# Patient Record
Sex: FEMALE | Race: WHITE | NOT HISPANIC OR LATINO | Employment: FULL TIME | URBAN - METROPOLITAN AREA
[De-identification: names, ages, dates, MRNs, and addresses within clinical notes are randomized per-mention and may not be internally consistent; named-entity substitution may affect disease eponyms.]

---

## 2017-01-09 ENCOUNTER — HOSPITAL ENCOUNTER (OUTPATIENT)
Dept: GENERAL RADIOLOGY | Facility: HOSPITAL | Age: 47
Discharge: HOME | End: 2017-01-09

## 2017-01-09 ENCOUNTER — ANESTHESIA (OUTPATIENT)
Dept: PAIN MEDICINE | Facility: HOSPITAL | Age: 47
End: 2017-01-09

## 2017-01-09 ENCOUNTER — ANESTHESIA EVENT (OUTPATIENT)
Dept: PAIN MEDICINE | Facility: HOSPITAL | Age: 47
End: 2017-01-09

## 2017-01-09 ENCOUNTER — HOSPITAL ENCOUNTER (OUTPATIENT)
Dept: PAIN MEDICINE | Facility: HOSPITAL | Age: 47
Discharge: HOME | End: 2017-01-09
Admitting: ORTHOPAEDIC SURGERY

## 2017-01-09 VITALS
DIASTOLIC BLOOD PRESSURE: 96 MMHG | HEART RATE: 96 BPM | OXYGEN SATURATION: 96 % | SYSTOLIC BLOOD PRESSURE: 131 MMHG | TEMPERATURE: 98.5 F | RESPIRATION RATE: 16 BRPM

## 2017-01-09 DIAGNOSIS — R52 PAIN: ICD-10-CM

## 2017-01-09 PROCEDURE — C1755 CATHETER, INTRASPINAL: HCPCS

## 2017-01-09 PROCEDURE — 25010000002 METHYLPREDNISOLONE PER 80 MG: Performed by: ANESTHESIOLOGY

## 2017-01-09 PROCEDURE — 77003 FLUOROGUIDE FOR SPINE INJECT: CPT

## 2017-01-09 RX ORDER — METHYLPREDNISOLONE ACETATE 80 MG/ML
80 INJECTION, SUSPENSION INTRA-ARTICULAR; INTRALESIONAL; INTRAMUSCULAR; SOFT TISSUE ONCE
Status: COMPLETED | OUTPATIENT
Start: 2017-01-09 | End: 2017-01-09

## 2017-01-09 RX ADMIN — METHYLPREDNISOLONE ACETATE 80 MG: 80 INJECTION, SUSPENSION INTRA-ARTICULAR; INTRALESIONAL; INTRAMUSCULAR; SOFT TISSUE at 14:06

## 2017-01-09 NOTE — IP AVS SNAPSHOT
AFTER VISIT FREIDA Arizmendi           About your hospitalization     You last received care in the:  [facility]    Unit phone number:  [phone]       Medications    If you or your caregiver advised us that you are currently taking a medication and that medication is marked below as “Resume”, this simply indicates that we have reviewed those medications to make sure our new therapy recommendations do not interfere.  If you have concerns about medications other than those new ones which we are prescribing today, please consult the physician who prescribed them (or your primary physician).  Our review of your home medications is not meant to indicate that we are directing their use.             Your Medications      CONTINUE taking these medications     acetaminophen 650 MG 8 hr tablet   Take 650 mg by mouth Every 8 (Eight) Hours As Needed for mild pain (1-3).   Commonly known as:  TYLENOL           AMBIEN 10 MG tablet   Take 10 mg by mouth At Night As Needed for sleep.   Generic drug:  zolpidem           amphetamine-dextroamphetamine XR 10 MG 24 hr capsule   Take 10 mg by mouth Every Morning. Pt. Takes 30 mg bid.   Commonly known as:  ADDERALL XR           aspirin 81 MG EC tablet   Take 81 mg by mouth Daily.           diclofenac 75 MG EC tablet   Take 75 mg by mouth 2 (Two) Times a Day.   Commonly known as:  VOLTAREN           DULoxetine 60 MG capsule   Take 60 mg by mouth Daily.   Commonly known as:  CYMBALTA           fluticasone 50 MCG/ACT nasal spray   2 sprays into each nostril Daily.   Commonly known as:  FLONASE           orphenadrine 100 MG 12 hr tablet   Take 100 mg by mouth 2 (Two) Times a Day.   Commonly known as:  NORFLEX           oxyCODONE-acetaminophen  MG per tablet   Take 1 tablet by mouth Every 6 (Six) Hours As Needed for moderate pain (4-6).   Commonly known as:  PERCOCET                    Instructions for After Discharge          Discharge  Instructions       Lumbar Epidural Steroid Injection Instructions  Plan includes:  1.  Lumbar epidural steroid injections, up to 3, spaced 1-2 weeks apart.  If pain control is acceptable after 1 or 2 injections, it was discussed with the patient that they may return for the subsequent injections if and when their pain returns.  The risks were discussed with the patient including failure of relief, worsening pain, Headache (post dural puncture headache), bleeding (epidural hematoma) and infection (epidural abscess or skin infection).  2.  Physical therapy exercises at home as prescribed by physical therapy or from the pain clinic handout (given to the patient).  Continuation of these exercises every day, or multiple times per week, even when the patient has good pain relief, was stressed to the patient as a preventative measure to decrease the frequency and severity of future pain episodes.  3.  Continue pain medicines as already prescribed.  If patient not currently taking any, it is recommended to begin Acetaminophen 1000 mg po q 8 hours.  If other medicines containing Acetaminophen are currently prescribed, maintain daily dose at 3000 mg.    4.  If they can tolerate NSAIDS, it is recommended to take Ibuprofen 600 mg po q 6 hours for 7 days during pain exacerbations.  Alternatively, they may substitute an NSAID of their choice (e.g. Aleve).  This may be taken at the same time as Acetaminophen.  5.  Heat and ice to the affected area as tolerated for pain control.  It was discussed that heating pads can cause burns.  6.  Daily low impact exercise such as walking or water exercise was recommended to maintain overall health and aid in weight control.   7.  Follow up as needed for subsequent injections.  8.  Patient was counseled to abstain from tobacco products.      Discharge References/Attachments     EPIDURAL STEROID INJECTION, CARE AFTER (ENGLISH)    BACK EXERCISES, EASY-TO-READ (ENGLISH)       Follow-ups for After  Discharge        [employer] Signup     [facility] [employer] allows you to send messages to your doctor, view your test results, renew your prescriptions, schedule appointments, and more. To sign up, go to [employer] and click on the Sign Up Now link in the New User? box. Enter your [employer] Activation Code exactly as it appears below along with the last four digits of your Social Security Number and your Date of Birth () to complete the sign-up process. If you do not sign up before the expiration date, you must request a new code.    [employer] Activation Code: [email or web address]  Expires: 2017  5:36 AM    If you have questions, you can email [email or web address] or call [phone] to talk to our [employer] staff. Remember, [employer] is NOT to be used for urgent needs. For medical emergencies, dial 911.           Summary of Your Hospitalization        Reason for Hospitalization     Your primary diagnosis was:  Not on File      Care Providers     Not on file      Your Allergies  Date Reviewed: 2017    Allergen Reactions    Vicodin (Hydrocodone-Acetaminophen) Itching      Patient Belongings Returned     Document Return of Belongings Flowsheet     Were the patient bedside belongings sent home?   N/A   Belongings Retrieved from Security & Sent Home   N/A    Belongings Sent to Safe   None   Medications Retrieved from Pharmacy & Sent Home   --              More Information      Epidural Steroid Injection, Care After  Refer to this sheet in the next few weeks. These instructions provide you with information on caring for yourself after your procedure. Your health care provider may also give you more specific instructions. Your treatment has been planned according to current medical practices, but problems sometimes occur. Call your health care provider if you have any problems or questions after your procedure.  WHAT TO EXPECT AFTER THE PROCEDURE  After your procedure, it is typical to have  minimal discomfort at the injection site.  HOME CARE INSTRUCTIONS   · Avoid the use of heat on the injection site for 1 day.  · Do not take a tub bath or soak in water the day of the procedure.  · Remove the bandage on the day after the procedure.  · Resume your normal activities the day after the procedure.  · Apply ice to reduce the soreness around the injection site:    Put ice in a plastic bag.    Place a towel between your skin and the bag.    Leave the ice on for 20 minutes, 2-3 times a day.  · Follow up with your health care provider 7-10 days after the procedure.  SEEK MEDICAL CARE IF:   · You have a fever.  · You continue to have pain and soreness around the injection site, even after taking medicines.  · You have significant nausea or vomiting.  · You have a severe headache.  SEEK IMMEDIATE MEDICAL CARE IF:   · You have severe pain at the injection site, which is not relieved by medicines.  · You have a severe headache, stiff neck, or sensitivity to light.  · You have any new numbness or weakness in your legs or arms.  · You lose control over your bladder or bowel movements.  · You have difficulty breathing.     This information is not intended to replace advice given to you by your health care provider. Make sure you discuss any questions you have with your health care provider.     Document Released: 2012 Document Revised: 2016 Document Reviewed: 2014  [employer] Interactive Patient Education ©[employer]          Back Exercises  If you have pain in your back, do these exercises 2-3 times each day or as told by your doctor. When the pain goes away, do the exercises once each day, but repeat the steps more times for each exercise (do more repetitions). If you do not have pain in your back, do these exercises once each day or as told by your doctor.  EXERCISES  Single Knee to Chest  Do these steps 3-5 times in a row for each le. Lie on your back on a firm bed or the floor with  your legs stretched out.  2. Bring one knee to your chest.  3. Hold your knee to your chest by grabbing your knee or thigh.  4. Pull on your knee until you feel a gentle stretch in your lower back.  5. Keep doing the stretch for 10-30 seconds.  6. Slowly let go of your leg and straighten it.  Pelvic Tilt  Do these steps 5-10 times in a row:  1. Lie on your back on a firm bed or the floor with your legs stretched out.  2. Bend your knees so they point up to the ceiling. Your feet should be flat on the floor.  3. Tighten your lower belly (abdomen) muscles to press your lower back against the floor. This will make your tailbone point up to the ceiling instead of pointing down to your feet or the floor.  4. Stay in this position for 5-10 seconds while you gently tighten your muscles and breathe evenly.  Cat-Cow  Do these steps until your lower back bends more easily:  1. Get on your hands and knees on a firm surface. Keep your hands under your shoulders, and keep your knees under your hips. You may put padding under your knees.  2. Let your head hang down, and make your tailbone point down to the floor so your lower back is round like the back of a cat.  3. Stay in this position for 5 seconds.  4. Slowly lift your head and make your tailbone point up to the ceiling so your back hangs low (sags) like the back of a cow.  5. Stay in this position for 5 seconds.  Press-Ups  Do these steps 5-10 times in a row:  1. Lie on your belly (face-down) on the floor.  2. Place your hands near your head, about shoulder-width apart.  3. While you keep your back relaxed and keep your hips on the floor, slowly straighten your arms to raise the top half of your body and lift your shoulders. Do not use your back muscles. To make yourself more comfortable, you may change where you place your hands.  4. Stay in this position for 5 seconds.  5. Slowly return to lying flat on the floor.  Bridges  Do these steps 10 times in a row:  1. Lie on  your back on a firm surface.  2. Bend your knees so they point up to the ceiling. Your feet should be flat on the floor.  3. Tighten your butt muscles and lift your butt off of the floor until your waist is almost as high as your knees. If you do not feel the muscles working in your butt and the back of your thighs, slide your feet 1-2 inches farther away from your butt.  4. Stay in this position for 3-5 seconds.  5. Slowly lower your butt to the floor, and let your butt muscles relax.  If this exercise is too easy, try doing it with your arms crossed over your chest.  Belly Crunches  Do these steps 5-10 times in a row:  1. Lie on your back on a firm bed or the floor with your legs stretched out.  2. Bend your knees so they point up to the ceiling. Your feet should be flat on the floor.  3. Cross your arms over your chest.  4. Tip your chin a little bit toward your chest but do not bend your neck.  5. Tighten your belly muscles and slowly raise your chest just enough to lift your shoulder blades a tiny bit off of the floor.  6. Slowly lower your chest and your head to the floor.  Back Lifts  Do these steps 5-10 times in a row:  1. Lie on your belly (face-down) with your arms at your sides, and rest your forehead on the floor.  2. Tighten the muscles in your legs and your butt.  3. Slowly lift your chest off of the floor while you keep your hips on the floor. Keep the back of your head in line with the curve in your back. Look at the floor while you do this.  4. Stay in this position for 3-5 seconds.  5. Slowly lower your chest and your face to the floor.  GET HELP IF:  · Your back pain gets a lot worse when you do an exercise.  · Your back pain does not lessen 2 hours after you exercise.  If you have any of these problems, stop doing the exercises. Do not do them again unless your doctor says it is okay.  GET HELP RIGHT AWAY IF:  · You have sudden, very bad back pain. If this happens, stop doing the exercises. Do  not do them again unless your doctor says it is okay.     This information is not intended to replace advice given to you by your health care provider. Make sure you discuss any questions you have with your health care provider.     Document Released: 01/20/2012 Document Revised: 09/07/2016 Document Reviewed: 02/11/2016  [employer] Interactive Patient Education ©2016 [employer].            SYMPTOMS OF A STROKE    Call 911 or have someone take you to the Emergency Department if you have any of the following:    · Sudden numbness or weakness of your face, arm or leg especially on one side of the body  · Sudden confusion, diffiiculty speaking or trouble understanding   · Changes in your vision or loss of sight in one eye  · Sudden severe headache with no known cause  · sudden dizziness, trouble walking, loss of balance or coordination    It is important to seek emergency care right away if you have further stroke symptoms. If you get emergency help quickly, the powerful clot-dissolving medicines can reduce the disabilities caused by a stroke.     For more information:    American Stroke Association  3-126-8-STROKE  www.strokeassociation.org           IF YOU SMOKE OR USE TOBACCO PLEASE READ THE FOLLOWING:    Why is smoking bad for me?  Smoking increases the risk of heart disease, lung disease, vascular disease, stroke, and cancer.     If you smoke, STOP!    If you would like more information on quitting smoking, please visit the [employer] website: [email or web address]   This link will provide additional resources including the QUIT line and the Beat the Pack support groups.     For more information:    American Cancer Society  ([phone] 602-5868    American Heart Association  1-389.725.3842               YOU ARE THE MOST IMPORTANT FACTOR IN YOUR RECOVERY.     Follow all instructions carefully.     I have reviewed my discharge instructions with my nurse, including the  following information, if applicable:     Information about my illness and diagnosis   Follow up appointments (including lab draws)   Wound Care   Equipment Needs   Medications (new and continuing) along with side effects   Preventative information such as vaccines and smoking cessations   Diet   Pain   I know when to contact my Doctor's office or seek emergency care      I want my nurse to describe the side effects of my medications: YES NO   If the answer is no, I understand the side effects of my medications: YES NO   My nurse described the side effects of my medications in a way that I could understand: YES NO   I have taken my personal belongings and my own medications with me at discharge: YES NO            I have received this information and my questions have been answered. I have discussed any concerns I see with this plan with the nurse or physician. I understand these instructions.    Signature of Patient or Responsible Person: _____________________________________    Date: _________________  Time: __________________    Signature of Healthcare Provider: _______________________________________  Date: _________________  Time: __________________

## 2017-01-09 NOTE — H&P
[facility]    History and Physical    Patient Name: Kamille Arizmendi  :  1970  MRN:  [id number]  Date of Admission: 2017    Subjective     Patient is a 46 y.o. female presents with chief complaint of acute low back, hips: right, buttock, knee: right and ankle: right pain.  Onset of symptoms was gradual starting several months ago.  Symptoms are associated/aggravated by nothing in particular. Symptoms improve with injection  She's had several months of radicular symptoms on the left side they were down below the knee one point time, now majority of her symptoms are posterior.  She describes pain in her hip and buttock and down the posterior aspect of her leg, very few symptoms down below the knee at this point in time.  She also has minimal back pain as well.  She's had 2 previous epidural steroid injections for which she reports probably 70% relief of her pain.    She has an MRI which shows an L5-S1 paracentral disc bulge in L4 5 and L3 4 broad-based disc bulge.    The following portions of the patients history were reviewed and updated as appropriate: current medications, allergies, past medical history, past surgical history, past family history, past social history and problem list                Objective     Past Medical History:   Past Medical History   Diagnosis Date   • ADHD (attention deficit hyperactivity disorder)    • Anxiety    • Arthritis    • Hypertension    • Low back pain    • Personal history of kidney stones      Past Surgical History:   Past Surgical History   Procedure Laterality Date   • Cholecystectomy     • Hysterectomy     • Lasik     • Ovarian cyst removal       Family History: History reviewed. No pertinent family history.  Social History:   Social History   Substance Use Topics   • Smoking status: Never Smoker   • Smokeless tobacco: Never Used   • Alcohol use No       Vital Signs Range for the last 24 hours  Temperature: Temp:  [36.9 °C (98.5 °F)] 36.9 °C (98.5 °F)   Temp  Source: Temp src: Oral   BP: BP: (156-180)/(106-108) 156/106   Pulse: Heart Rate:  [93] 93   Respirations: Resp:  [16] 16   SPO2: SpO2:  [99 %] 99 %   O2 Amount (l/min):     O2 Devices O2 Device: room air   Weight:           --------------------------------------------------------------------------------    Current Outpatient Prescriptions   Medication Sig Dispense Refill   • acetaminophen (TYLENOL) 650 MG 8 hr tablet Take 650 mg by mouth Every 8 (Eight) Hours As Needed for mild pain (1-3).     • amphetamine-dextroamphetamine XR (ADDERALL XR) 10 MG 24 hr capsule Take 10 mg by mouth Every Morning. Pt. Takes 30 mg bid.     • diclofenac (VOLTAREN) 75 MG EC tablet Take 75 mg by mouth 2 (Two) Times a Day.     • DULoxetine (CYMBALTA) 60 MG capsule Take 60 mg by mouth Daily.     • fluticasone (FLONASE) 50 MCG/ACT nasal spray 2 sprays into each nostril Daily.     • orphenadrine (NORFLEX) 100 MG 12 hr tablet Take 100 mg by mouth 2 (Two) Times a Day.     • oxyCODONE-acetaminophen (PERCOCET)  MG per tablet Take 1 tablet by mouth Every 6 (Six) Hours As Needed for moderate pain (4-6).     • zolpidem (AMBIEN) 10 MG tablet Take 10 mg by mouth At Night As Needed for sleep.     • aspirin 81 MG EC tablet Take 81 mg by mouth Daily.       No current facility-administered medications for this encounter.        --------------------------------------------------------------------------------  Assessment/Plan      Anesthesia Evaluation      Airway   Mallampati: II  Dental - normal exam     Pulmonary - normal exam   Cardiovascular - normal exam  (+) hypertension,     Neuro/Psych- negative ROS  GI/Hepatic/Renal/Endo - negative ROS     Musculoskeletal (-) normal exam    Abdominal    Substance History      OB/GYN          Other                        Diagnosis and Plan    Treatment Plan  ASA 2        Lumbar Epidural Steroid Injection(LESI), With fluoroscopy,           Diagnosis     * Lumbar neuritis [M54.16]     * Lumbar disc disorder  [M51.9]

## 2017-01-09 NOTE — DISCHARGE INSTRUCTIONS
Lumbar Epidural Steroid Injection Instructions  Plan includes:  1.  Lumbar epidural steroid injections, up to 3, spaced 1-2 weeks apart.  If pain control is acceptable after 1 or 2 injections, it was discussed with the patient that they may return for the subsequent injections if and when their pain returns.  The risks were discussed with the patient including failure of relief, worsening pain, Headache (post dural puncture headache), bleeding (epidural hematoma) and infection (epidural abscess or skin infection).  2.  Physical therapy exercises at home as prescribed by physical therapy or from the pain clinic handout (given to the patient).  Continuation of these exercises every day, or multiple times per week, even when the patient has good pain relief, was stressed to the patient as a preventative measure to decrease the frequency and severity of future pain episodes.  3.  Continue pain medicines as already prescribed.  If patient not currently taking any, it is recommended to begin Acetaminophen 1000 mg po q 8 hours.  If other medicines containing Acetaminophen are currently prescribed, maintain daily dose at 3000 mg.    4.  If they can tolerate NSAIDS, it is recommended to take Ibuprofen 600 mg po q 6 hours for 7 days during pain exacerbations.  Alternatively, they may substitute an NSAID of their choice (e.g. Aleve).  This may be taken at the same time as Acetaminophen.  5.  Heat and ice to the affected area as tolerated for pain control.  It was discussed that heating pads can cause burns.  6.  Daily low impact exercise such as walking or water exercise was recommended to maintain overall health and aid in weight control.   7.  Follow up as needed for subsequent injections.  8.  Patient was counseled to abstain from tobacco products.

## 2017-06-23 RX ORDER — DEXTROAMPHETAMINE SACCHARATE, AMPHETAMINE ASPARTATE, DEXTROAMPHETAMINE SULFATE AND AMPHETAMINE SULFATE 7.5; 7.5; 7.5; 7.5 MG/1; MG/1; MG/1; MG/1
30 TABLET ORAL DAILY
COMMUNITY

## 2017-06-23 RX ORDER — CETIRIZINE HYDROCHLORIDE 10 MG/1
10 TABLET ORAL DAILY
COMMUNITY

## 2017-07-06 ENCOUNTER — OFFICE VISIT (OUTPATIENT)
Dept: NEUROSURGERY | Facility: CLINIC | Age: 47
End: 2017-07-06

## 2017-07-06 VITALS
SYSTOLIC BLOOD PRESSURE: 150 MMHG | BODY MASS INDEX: 32.49 KG/M2 | DIASTOLIC BLOOD PRESSURE: 88 MMHG | WEIGHT: 207 LBS | HEIGHT: 67 IN | HEART RATE: 110 BPM

## 2017-07-06 DIAGNOSIS — M51.16 NEURITIS OR RADICULITIS DUE TO RUPTURE OF LUMBAR INTERVERTEBRAL DISC: Primary | ICD-10-CM

## 2017-07-06 PROCEDURE — 99204 OFFICE O/P NEW MOD 45 MIN: CPT | Performed by: NEUROLOGICAL SURGERY

## 2017-07-06 RX ORDER — DULOXETIN HYDROCHLORIDE 30 MG/1
CAPSULE, DELAYED RELEASE ORAL
Refills: 4 | COMMUNITY
Start: 2017-06-28 | End: 2017-08-24

## 2017-07-06 RX ORDER — LISDEXAMFETAMINE DIMESYLATE 50 MG
CAPSULE ORAL
Refills: 0 | COMMUNITY
Start: 2017-06-05 | End: 2017-08-24

## 2017-07-06 RX ORDER — BUSPIRONE HYDROCHLORIDE 15 MG/1
TABLET ORAL
Refills: 1 | COMMUNITY
Start: 2017-06-28 | End: 2017-08-24

## 2017-07-13 ENCOUNTER — APPOINTMENT (OUTPATIENT)
Dept: MRI IMAGING | Facility: HOSPITAL | Age: 47
End: 2017-07-13
Attending: NEUROLOGICAL SURGERY

## 2017-07-25 ENCOUNTER — HOSPITAL ENCOUNTER (OUTPATIENT)
Dept: GENERAL RADIOLOGY | Facility: HOSPITAL | Age: 47
Discharge: HOME | End: 2017-07-25
Attending: NEUROLOGICAL SURGERY | Admitting: NEUROLOGICAL SURGERY

## 2017-07-25 DIAGNOSIS — M51.16 NEURITIS OR RADICULITIS DUE TO RUPTURE OF LUMBAR INTERVERTEBRAL DISC: ICD-10-CM

## 2017-07-25 PROCEDURE — 72114 X-RAY EXAM L-S SPINE BENDING: CPT

## 2017-07-27 ENCOUNTER — OFFICE VISIT (OUTPATIENT)
Dept: NEUROSURGERY | Facility: CLINIC | Age: 47
End: 2017-07-27

## 2017-07-27 VITALS
SYSTOLIC BLOOD PRESSURE: 156 MMHG | BODY MASS INDEX: 32.49 KG/M2 | WEIGHT: 207 LBS | DIASTOLIC BLOOD PRESSURE: 91 MMHG | HEART RATE: 92 BPM | HEIGHT: 67 IN

## 2017-07-27 DIAGNOSIS — M51.16 NEURITIS OR RADICULITIS DUE TO RUPTURE OF LUMBAR INTERVERTEBRAL DISC: Primary | ICD-10-CM

## 2017-07-27 PROCEDURE — 99213 OFFICE O/P EST LOW 20 MIN: CPT | Performed by: NEUROLOGICAL SURGERY

## 2017-07-27 RX ORDER — CEFAZOLIN SODIUM 2 G/100ML
2 INJECTION, SOLUTION INTRAVENOUS ONCE
Status: CANCELLED | OUTPATIENT
Start: 2017-09-06 | End: 2017-07-27

## 2017-07-27 NOTE — PATIENT INSTRUCTIONS

## 2017-07-27 NOTE — PROGRESS NOTES
Subjective   Patient ID: Kamille Arizmendi is a 46 y.o. female is here today for follow-up with new MRI and XR ordered for back pain that radiates into left leg with numbness and tingling.    Neurologic Problem   The patient's pertinent negatives include no altered mental status, clumsiness, focal sensory loss, focal weakness, loss of balance, memory loss, near-syncope, slurred speech, syncope, visual change or weakness. This is a chronic problem. The current episode started more than 1 year ago. The neurological problem developed gradually. The problem has been gradually worsening since onset. There was left-sided and lower extremity focality noted. Associated symptoms include back pain. Pertinent negatives include no abdominal pain, auditory change, aura, bladder incontinence, bowel incontinence, chest pain, confusion, diaphoresis, dizziness, fatigue, fever, headaches, light-headedness, nausea, neck pain, palpitations, shortness of breath, vertigo or vomiting. Past treatments include acetaminophen, medication, position change, sleep and walking. The treatment provided mild relief.     This patient continues with severe pain in her back and into her left leg.  Her right leg is okay.    The following portions of the patient's history were reviewed and updated as appropriate: allergies, current medications, past family history, past medical history, past social history, past surgical history and problem list.    Review of Systems   Constitutional: Negative for diaphoresis, fatigue and fever.   Respiratory: Negative for shortness of breath.    Cardiovascular: Negative for chest pain, palpitations and near-syncope.   Gastrointestinal: Negative for abdominal pain, bowel incontinence, nausea and vomiting.   Genitourinary: Negative for bladder incontinence.   Musculoskeletal: Positive for back pain. Negative for neck pain.   Neurological: Positive for numbness (Left leg). Negative for dizziness, vertigo, focal weakness,  syncope, weakness, light-headedness, headaches and loss of balance.        Tingling in left leg   Psychiatric/Behavioral: Negative for confusion and memory loss.   All other systems reviewed and are negative.      Objective   Physical Exam   Constitutional: She is oriented to person, place, and time. She appears well-developed and well-nourished.   Neurological: She is oriented to person, place, and time.     Neurologic Exam     Mental Status   Oriented to person, place, and time.       Assessment/Plan   Independent Review of Radiographic Studies:      I reviewed plain films of her lumbar spine done on 25 July.  This does show her hip implant on the left side.  There is degenerative change particularly at L5-S1 and some minimal lumbar scoliosis and no evidence of abnormal movement on flexion and extension.  I also reviewed an MRI of her lumbar spine done on July 13 myself.  This looks okay at L1 2.  There is a little disc bulging at L2-3 but not severe.  L3 4 shows a little central stenosis and some disc bulging but no significant pressure on the nerves.  L4 5 shows a more significant central stenosis and some disc bulging into the neural foramen on the left side causing pressure almost certainly on the left L4 nerve root in the foramen.  There is also a central and left-sided disc herniation at L5-S1 that is causing pressure on the S1 and probably a little pressure on the L5 nerve root as well.  The disc at L4 5 appears to be a little larger than it was last year in the disc at L5-S1 is about the same size.    Medical Decision Making:      I told the patient and her wife about the findings on the MRI.  I told her that there is nothing here so dangerous that we absolutely have to do surgery but it is certainly an option if she wishes to proceed.  I told the patient about the risks, complications and expected outcome of the lumbar surgery.  I explained that there was an 80% chance of getting rid of the pain in the  leg.  I explained that there would still be back pain after the surgery.  Initially this will be quite severe but will improve over time.  There is a 2 or 3% chance of infection, bleeding, CSF leak, damage to the nerve as a result of surgery, paralysis, as well as anesthetic risk.  There is a 5% chance of late instability which could require a fusion later on and a 10% chance of recurrent disc herniation.  We discussed the postoperative hospital and home course.  I also discussed my policies on pain medications with her.  She will think about it and let us know if she wishes to proceed.    If she calls she will need to be scheduled for a: Left L4 to S1 laminectomy and discectomy with Metrix    Kamille was seen today for back pain.    Diagnoses and all orders for this visit:    Neuritis or radiculitis due to rupture of lumbar intervertebral disc  -     Case Request; Standing  -     ceFAZolin (ANCEF) 2 g in sodium chloride 0.9 % 100 mL IVPB; Infuse 2 g into a venous catheter 1 (One) Time.    Other orders  -     Follow anesthesia standing orders.  -     Obtain informed consent  -     Follow anesthesia standing orders.; Standing  -     ASUNCION hose- To be placed on patient in pre-op; Standing  -     SCD (sequential compression device)- to be placed on patient in Pre-op; Standing      Return for 2-3 week post op.

## 2017-08-24 ENCOUNTER — OFFICE VISIT (OUTPATIENT)
Dept: NEUROSURGERY | Facility: CLINIC | Age: 47
End: 2017-08-24

## 2017-08-24 ENCOUNTER — APPOINTMENT (OUTPATIENT)
Dept: PREADMISSION TESTING | Facility: HOSPITAL | Age: 47
End: 2017-08-24

## 2017-08-24 VITALS
SYSTOLIC BLOOD PRESSURE: 143 MMHG | BODY MASS INDEX: 32.02 KG/M2 | OXYGEN SATURATION: 100 % | DIASTOLIC BLOOD PRESSURE: 89 MMHG | HEIGHT: 67 IN | RESPIRATION RATE: 16 BRPM | HEART RATE: 93 BPM | WEIGHT: 204 LBS | TEMPERATURE: 97.5 F

## 2017-08-24 VITALS
BODY MASS INDEX: 32.02 KG/M2 | SYSTOLIC BLOOD PRESSURE: 148 MMHG | DIASTOLIC BLOOD PRESSURE: 93 MMHG | HEART RATE: 104 BPM | WEIGHT: 204 LBS | HEIGHT: 67 IN

## 2017-08-24 DIAGNOSIS — M51.16 NEURITIS OR RADICULITIS DUE TO RUPTURE OF LUMBAR INTERVERTEBRAL DISC: Primary | ICD-10-CM

## 2017-08-24 LAB
ANION GAP SERPL CALCULATED.3IONS-SCNC: 13.3 MMOL/L
BUN BLD-MCNC: 13 MG/DL (ref 6–20)
BUN/CREAT SERPL: 17.6 (ref 7–25)
CALCIUM SPEC-SCNC: 9.3 MG/DL (ref 8.6–10.5)
CHLORIDE SERPL-SCNC: 102 MMOL/L (ref 98–107)
CO2 SERPL-SCNC: 26.7 MMOL/L (ref 22–29)
CREAT BLD-MCNC: 0.74 MG/DL (ref 0.57–1)
DEPRECATED RDW RBC AUTO: 47.5 FL (ref 37–54)
ERYTHROCYTE [DISTWIDTH] IN BLOOD BY AUTOMATED COUNT: 13.2 % (ref 11.7–13)
GFR SERPL CREATININE-BSD FRML MDRD: 84 ML/MIN/1.73
GLUCOSE BLD-MCNC: 105 MG/DL (ref 65–99)
HCT VFR BLD AUTO: 44.6 % (ref 35.6–45.5)
HGB BLD-MCNC: 14 G/DL (ref 11.9–15.5)
MCH RBC QN AUTO: 30.7 PG (ref 26.9–32)
MCHC RBC AUTO-ENTMCNC: 31.4 G/DL (ref 32.4–36.3)
MCV RBC AUTO: 97.8 FL (ref 80.5–98.2)
PLATELET # BLD AUTO: 271 10*3/MM3 (ref 140–500)
PMV BLD AUTO: 10 FL (ref 6–12)
POTASSIUM BLD-SCNC: 3.8 MMOL/L (ref 3.5–5.2)
RBC # BLD AUTO: 4.56 10*6/MM3 (ref 3.9–5.2)
SODIUM BLD-SCNC: 142 MMOL/L (ref 136–145)
WBC NRBC COR # BLD: 6.79 10*3/MM3 (ref 4.5–10.7)

## 2017-08-24 PROCEDURE — 99213 OFFICE O/P EST LOW 20 MIN: CPT | Performed by: NEUROLOGICAL SURGERY

## 2017-08-24 PROCEDURE — 93010 ELECTROCARDIOGRAM REPORT: CPT | Performed by: INTERNAL MEDICINE

## 2017-08-24 PROCEDURE — 36415 COLL VENOUS BLD VENIPUNCTURE: CPT

## 2017-08-24 PROCEDURE — 80048 BASIC METABOLIC PNL TOTAL CA: CPT | Performed by: NEUROLOGICAL SURGERY

## 2017-08-24 PROCEDURE — 93005 ELECTROCARDIOGRAM TRACING: CPT

## 2017-08-24 PROCEDURE — 85027 COMPLETE CBC AUTOMATED: CPT | Performed by: NEUROLOGICAL SURGERY

## 2017-08-24 RX ORDER — BUSPIRONE HYDROCHLORIDE 30 MG/1
30 TABLET ORAL 2 TIMES DAILY
COMMUNITY

## 2017-08-24 NOTE — DISCHARGE INSTRUCTIONS
Take the following medications the morning of surgery with a small sip of water:    BUSPAR AND CYMBALTA.    ARRIVE AT 7:30    General Instructions:  • Do not eat solid food after midnight the night before surgery.  • You may drink clear liquids day of surgery but must stop at least one hour before your hospital arrival time.  • It is beneficial for you to have a clear drink that contains carbohydrates the day of surgery.  We suggest a 20 ounce bottle of Gatorade or Powerade for non-diabetic patients or a 20 ounce bottle of G2 or Powerade Zero for diabetic patients. (Pediatric patients, are not advised to drink a 20 ounce carbohydrate drink)    Clear liquids are liquids you can see through.  Nothing red in color.     Plain water                               Sports drinks  Sodas                                   Gelatin (Jell-O)  Fruit juices without pulp such as white grape juice and apple juice  Popsicles that contain no fruit or yogurt  Tea or coffee (no cream or milk added)  Gatorade / Powerade  G2 / Powerade Zero    • Infants may have breast milk up to four hours before surgery.  • Infants drinking formula may drink formula up to six hours before surgery.   • Patients who avoid smoking, chewing tobacco and alcohol for 4 weeks prior to surgery have a reduced risk of post-operative complications.  Quit smoking as many days before surgery as you can.  • Do not smoke, use chewing tobacco or drink alcohol the day of surgery.   • If applicable bring your C-PAP/ BI-PAP machine.  • Bring any papers given to you in the doctor’s office.  • Wear clean comfortable clothes and socks.  • Do not wear contact lenses or make-up.  Bring a case for your glasses.   • Bring crutches or walker if applicable.  • Leave all other valuables and jewelry at home.  • The Pre-Admission Testing nurse will instruct you to bring medications if unable to obtain an accurate list in Pre-Admission Testing.        If you were given a blood bank ID  arm band remember to bring it with you the day of surgery.    Preventing a Surgical Site Infection:  • For 2 to 3 days before surgery, avoid shaving with a razor because the razor can irritate skin and make it easier to develop an infection.  • The night prior to surgery sleep in a clean bed with clean clothing.  Do not allow pets to sleep with you.  • Shower on the morning of surgery using a fresh bar of anti-bacterial soap (such as Dial) and clean washcloth.  Dry with a clean towel and dress in clean clothing.  • Ask your surgeon if you will be receiving antibiotics prior to surgery.  • Make sure you, your family, and all healthcare providers clean their hands with soap and water or an alcohol based hand  before caring for you or your wound.    Day of surgery:  Upon arrival, a Pre-op nurse and Anesthesiologist will review your health history, obtain vital signs, and answer questions you may have.  The only belongings needed at this time will be your home medications and if applicable your C-PAP/BI-PAP machine.  If you are staying overnight your family can leave the rest of your belongings in the car and bring them to your room later.  A Pre-op nurse will start an IV and you may receive medication in preparation for surgery, including something to help you relax.  Your family will be able to see you in the Pre-op area.  While you are in surgery your family should notify the waiting room  if they leave the waiting room area and provide a contact phone number.    Please be aware that surgery does come with discomfort.  We want to make every effort to control your discomfort so please discuss any uncontrolled symptoms with your nurse.   Your doctor will most likely have prescribed pain medications.      If you are going home after surgery you will receive individualized written care instructions before being discharged.  A responsible adult must drive you to and from the hospital on the day of  your surgery and stay with you for 24 hours.    If you are staying overnight following surgery, you will be transported to your hospital room following the recovery period.  [facility] has all private rooms.    If you have any questions please call Pre-Admission Testing at [phone].  Deductibles and co-payments are collected on the day of service. Please be prepared to pay the required co-pay, deductible or deposit on the day of service as defined by your plan.

## 2017-08-24 NOTE — PROGRESS NOTES
Subjective   Patient ID: Kamille Arizmendi is a 46 y.o. female is here today for follow-up on back pain that radiates into left leg with numbness and tingling.     Back Pain   This is a chronic problem. The current episode started more than 1 year ago. The problem occurs constantly. The problem has been gradually worsening since onset. The pain is present in the sacro-iliac. The quality of the pain is described as stabbing. The pain radiates to the left foot. The pain is at a severity of 8/10. The pain is the same all the time. The symptoms are aggravated by bending. Stiffness is present in the morning. Associated symptoms include leg pain, numbness, paresthesias and tingling.     This patient continues with pain in her back and into her left leg.  The pain is sharp and stabbing and quite severe.  It is steadily getting worse.  The right leg is okay.    The following portions of the patient's history were reviewed and updated as appropriate: allergies, current medications, past family history, past medical history, past social history, past surgical history and problem list.    Review of Systems   Respiratory: Negative for chest tightness and shortness of breath.    Musculoskeletal: Positive for back pain.        Left leg pain   Neurological: Positive for tingling, numbness and paresthesias.        Tingling in left leg   All other systems reviewed and are negative.      Objective   Physical Exam   Constitutional: She is oriented to person, place, and time. She appears well-developed and well-nourished.   Eyes: EOM are normal. Pupils are equal, round, and reactive to light.   Neurological: She is oriented to person, place, and time. She has a normal Finger-Nose-Finger Test and a normal Heel to Shin Test. Gait normal.   Reflex Scores:       Tricep reflexes are 2+ on the right side and 2+ on the left side.       Bicep reflexes are 2+ on the right side and 2+ on the left side.       Brachioradialis reflexes are 2+ on the  right side and 2+ on the left side.       Patellar reflexes are 2+ on the right side and 2+ on the left side.       Achilles reflexes are 2+ on the right side and 2+ on the left side.  Psychiatric: Her speech is normal.     Neurologic Exam     Mental Status   Oriented to person, place, and time.   Registration of memory: Good recent and remote memory.   Attention: normal. Concentration: normal.   Speech: speech is normal   Level of consciousness: alert  Knowledge: consistent with education.     Cranial Nerves     CN II   Visual fields full to confrontation.   Visual acuity: normal    CN III, IV, VI   Pupils are equal, round, and reactive to light.  Extraocular motions are normal.     CN V   Facial sensation intact.   Right corneal reflex: normal  Left corneal reflex: normal    CN VII   Facial expression full, symmetric.   Right facial weakness: none  Left facial weakness: none    CN VIII   Hearing: intact    CN IX, X   Palate: symmetric    CN XI   Right sternocleidomastoid strength: normal  Left sternocleidomastoid strength: normal    CN XII   Tongue: not atrophic  Tongue deviation: none    Motor Exam   Muscle bulk: normal  Right arm tone: normal  Left arm tone: normal  Right leg tone: normal  Left leg tone: normal    Strength   Strength 5/5 except as noted.     Sensory Exam   Light touch normal.     Gait, Coordination, and Reflexes     Gait  Gait: normal    Coordination   Finger to nose coordination: normal  Heel to shin coordination: normal    Reflexes   Right brachioradialis: 2+  Left brachioradialis: 2+  Right biceps: 2+  Left biceps: 2+  Right triceps: 2+  Left triceps: 2+  Right patellar: 2+  Left patellar: 2+  Right achilles: 2+  Left achilles: 2+  Right : 2+  Left : 2+      Assessment/Plan   Independent Review of Radiographic Studies:      I again reviewed plain films of her lumbar spine done on 25 July.  This does show her hip implant on the left side.  There is degenerative change particularly at  L5-S1 and some minimal lumbar scoliosis and no evidence of abnormal movement on flexion and extension.  I also reviewed an MRI of her lumbar spine done on July 13 myself.  This looks okay at L1 2.  There is a little disc bulging at L2-3 but not severe.  L3 4 shows a little central stenosis and some disc bulging but no significant pressure on the nerves.  L4 5 shows a more significant central stenosis and some disc bulging into the neural foramen on the left side causing pressure almost certainly on the left L4 nerve root in the foramen.  There is also a central and left-sided disc herniation at L5-S1 that is causing pressure on the S1 and probably a little pressure on the L5 nerve root as well.  The disc at L4 5 appears to be a little larger than it was last year in the disc at L5-S1 is about the same size.    Medical Decision Making:      I again told the patient and her wife about the findings on the imaging.  She does wish to proceed with surgery at this time.  I again discussed the risk and complications of surgery as well as my policies on pain medications.  She seems to understand everything fairly well and does ask to proceed.    Kamille was seen today for back pain and leg pain.    Diagnoses and all orders for this visit:    Neuritis or radiculitis due to rupture of lumbar intervertebral disc    Return for 2-3 week post op.

## 2017-09-06 ENCOUNTER — ANESTHESIA (OUTPATIENT)
Dept: PERIOP | Facility: HOSPITAL | Age: 47
End: 2017-09-06

## 2017-09-06 ENCOUNTER — ANESTHESIA EVENT (OUTPATIENT)
Dept: PERIOP | Facility: HOSPITAL | Age: 47
End: 2017-09-06

## 2017-09-06 ENCOUNTER — HOSPITAL ENCOUNTER (OUTPATIENT)
Facility: HOSPITAL | Age: 47
Setting detail: HOSPITAL OUTPATIENT SURGERY
Discharge: HOME | End: 2017-09-06
Attending: NEUROLOGICAL SURGERY | Admitting: NEUROLOGICAL SURGERY

## 2017-09-06 ENCOUNTER — APPOINTMENT (OUTPATIENT)
Dept: GENERAL RADIOLOGY | Facility: HOSPITAL | Age: 47
End: 2017-09-06

## 2017-09-06 VITALS
OXYGEN SATURATION: 98 % | BODY MASS INDEX: 32.58 KG/M2 | RESPIRATION RATE: 18 BRPM | SYSTOLIC BLOOD PRESSURE: 136 MMHG | DIASTOLIC BLOOD PRESSURE: 80 MMHG | TEMPERATURE: 98 F | WEIGHT: 207.6 LBS | HEART RATE: 88 BPM | HEIGHT: 67 IN

## 2017-09-06 DIAGNOSIS — M51.16 NEURITIS OR RADICULITIS DUE TO RUPTURE OF LUMBAR INTERVERTEBRAL DISC: ICD-10-CM

## 2017-09-06 PROCEDURE — 72100 X-RAY EXAM L-S SPINE 2/3 VWS: CPT

## 2017-09-06 PROCEDURE — 25010000002 MIDAZOLAM PER 1 MG: Performed by: ANESTHESIOLOGY

## 2017-09-06 PROCEDURE — 63030 LAMOT DCMPRN NRV RT 1 LMBR: CPT | Performed by: NEUROLOGICAL SURGERY

## 2017-09-06 PROCEDURE — 76000 FLUOROSCOPY <1 HR PHYS/QHP: CPT

## 2017-09-06 PROCEDURE — 25010000002 ONDANSETRON PER 1 MG: Performed by: NURSE ANESTHETIST, CERTIFIED REGISTERED

## 2017-09-06 PROCEDURE — 25010000002 NEOSTIGMINE PER 0.5 MG: Performed by: NURSE ANESTHETIST, CERTIFIED REGISTERED

## 2017-09-06 PROCEDURE — 25010000002 DEXAMETHASONE PER 1 MG: Performed by: NURSE ANESTHETIST, CERTIFIED REGISTERED

## 2017-09-06 PROCEDURE — 63047 LAM FACETEC & FORAMOT LUMBAR: CPT | Performed by: NEUROLOGICAL SURGERY

## 2017-09-06 PROCEDURE — 25010000002 PROPOFOL 10 MG/ML EMULSION: Performed by: NURSE ANESTHETIST, CERTIFIED REGISTERED

## 2017-09-06 PROCEDURE — 25010000002 FENTANYL CITRATE (PF) 100 MCG/2ML SOLUTION: Performed by: NURSE ANESTHETIST, CERTIFIED REGISTERED

## 2017-09-06 PROCEDURE — 25010000002 KETOROLAC TROMETHAMINE PER 15 MG: Performed by: NURSE ANESTHETIST, CERTIFIED REGISTERED

## 2017-09-06 PROCEDURE — 25010000003 CEFAZOLIN IN DEXTROSE 2-4 GM/100ML-% SOLUTION: Performed by: NEUROLOGICAL SURGERY

## 2017-09-06 RX ORDER — OXYCODONE AND ACETAMINOPHEN 7.5; 325 MG/1; MG/1
TABLET ORAL
Status: COMPLETED
Start: 2017-09-06 | End: 2017-09-06

## 2017-09-06 RX ORDER — PROPOFOL 10 MG/ML
VIAL (ML) INTRAVENOUS AS NEEDED
Status: DISCONTINUED | OUTPATIENT
Start: 2017-09-06 | End: 2017-09-06 | Stop reason: SURG

## 2017-09-06 RX ORDER — MIDAZOLAM HYDROCHLORIDE 1 MG/ML
1 INJECTION INTRAMUSCULAR; INTRAVENOUS
Status: DISCONTINUED | OUTPATIENT
Start: 2017-09-06 | End: 2017-09-06 | Stop reason: HOSPADM

## 2017-09-06 RX ORDER — HYDROMORPHONE HYDROCHLORIDE 1 MG/ML
0.5 INJECTION, SOLUTION INTRAMUSCULAR; INTRAVENOUS; SUBCUTANEOUS
Status: DISCONTINUED | OUTPATIENT
Start: 2017-09-06 | End: 2017-09-06 | Stop reason: HOSPADM

## 2017-09-06 RX ORDER — LIDOCAINE HYDROCHLORIDE 20 MG/ML
INJECTION, SOLUTION INFILTRATION; PERINEURAL AS NEEDED
Status: DISCONTINUED | OUTPATIENT
Start: 2017-09-06 | End: 2017-09-06 | Stop reason: SURG

## 2017-09-06 RX ORDER — FENTANYL CITRATE 50 UG/ML
50 INJECTION, SOLUTION INTRAMUSCULAR; INTRAVENOUS
Status: DISCONTINUED | OUTPATIENT
Start: 2017-09-06 | End: 2017-09-06 | Stop reason: HOSPADM

## 2017-09-06 RX ORDER — OXYCODONE AND ACETAMINOPHEN 7.5; 325 MG/1; MG/1
1 TABLET ORAL ONCE AS NEEDED
Status: COMPLETED | OUTPATIENT
Start: 2017-09-06 | End: 2017-09-06

## 2017-09-06 RX ORDER — DEXAMETHASONE SODIUM PHOSPHATE 10 MG/ML
INJECTION INTRAMUSCULAR; INTRAVENOUS AS NEEDED
Status: DISCONTINUED | OUTPATIENT
Start: 2017-09-06 | End: 2017-09-06 | Stop reason: SURG

## 2017-09-06 RX ORDER — FAMOTIDINE 10 MG/ML
20 INJECTION, SOLUTION INTRAVENOUS ONCE
Status: COMPLETED | OUTPATIENT
Start: 2017-09-06 | End: 2017-09-06

## 2017-09-06 RX ORDER — LABETALOL HYDROCHLORIDE 5 MG/ML
5 INJECTION, SOLUTION INTRAVENOUS
Status: DISCONTINUED | OUTPATIENT
Start: 2017-09-06 | End: 2017-09-06 | Stop reason: HOSPADM

## 2017-09-06 RX ORDER — ONDANSETRON 2 MG/ML
INJECTION INTRAMUSCULAR; INTRAVENOUS AS NEEDED
Status: DISCONTINUED | OUTPATIENT
Start: 2017-09-06 | End: 2017-09-06 | Stop reason: SURG

## 2017-09-06 RX ORDER — KETOROLAC TROMETHAMINE 30 MG/ML
INJECTION, SOLUTION INTRAMUSCULAR; INTRAVENOUS AS NEEDED
Status: DISCONTINUED | OUTPATIENT
Start: 2017-09-06 | End: 2017-09-06 | Stop reason: SURG

## 2017-09-06 RX ORDER — ONDANSETRON 2 MG/ML
4 INJECTION INTRAMUSCULAR; INTRAVENOUS ONCE AS NEEDED
Status: DISCONTINUED | OUTPATIENT
Start: 2017-09-06 | End: 2017-09-06 | Stop reason: HOSPADM

## 2017-09-06 RX ORDER — PROMETHAZINE HYDROCHLORIDE 25 MG/ML
12.5 INJECTION, SOLUTION INTRAMUSCULAR; INTRAVENOUS ONCE AS NEEDED
Status: DISCONTINUED | OUTPATIENT
Start: 2017-09-06 | End: 2017-09-06 | Stop reason: HOSPADM

## 2017-09-06 RX ORDER — SODIUM CHLORIDE, SODIUM LACTATE, POTASSIUM CHLORIDE, CALCIUM CHLORIDE 600; 310; 30; 20 MG/100ML; MG/100ML; MG/100ML; MG/100ML
9 INJECTION, SOLUTION INTRAVENOUS CONTINUOUS
Status: DISCONTINUED | OUTPATIENT
Start: 2017-09-06 | End: 2017-09-06 | Stop reason: HOSPADM

## 2017-09-06 RX ORDER — CEFAZOLIN SODIUM 2 G/100ML
2 INJECTION, SOLUTION INTRAVENOUS ONCE
Status: COMPLETED | OUTPATIENT
Start: 2017-09-06 | End: 2017-09-06

## 2017-09-06 RX ORDER — HYDRALAZINE HYDROCHLORIDE 20 MG/ML
5 INJECTION INTRAMUSCULAR; INTRAVENOUS
Status: DISCONTINUED | OUTPATIENT
Start: 2017-09-06 | End: 2017-09-06 | Stop reason: HOSPADM

## 2017-09-06 RX ORDER — MIDAZOLAM HYDROCHLORIDE 1 MG/ML
2 INJECTION INTRAMUSCULAR; INTRAVENOUS
Status: DISCONTINUED | OUTPATIENT
Start: 2017-09-06 | End: 2017-09-06 | Stop reason: HOSPADM

## 2017-09-06 RX ORDER — GLYCOPYRROLATE 0.2 MG/ML
INJECTION INTRAMUSCULAR; INTRAVENOUS AS NEEDED
Status: DISCONTINUED | OUTPATIENT
Start: 2017-09-06 | End: 2017-09-06 | Stop reason: SURG

## 2017-09-06 RX ORDER — FENTANYL CITRATE 50 UG/ML
INJECTION, SOLUTION INTRAMUSCULAR; INTRAVENOUS AS NEEDED
Status: DISCONTINUED | OUTPATIENT
Start: 2017-09-06 | End: 2017-09-06 | Stop reason: SURG

## 2017-09-06 RX ORDER — PROMETHAZINE HYDROCHLORIDE 25 MG/1
25 TABLET ORAL ONCE AS NEEDED
Status: DISCONTINUED | OUTPATIENT
Start: 2017-09-06 | End: 2017-09-06 | Stop reason: HOSPADM

## 2017-09-06 RX ORDER — PROMETHAZINE HYDROCHLORIDE 25 MG/1
25 SUPPOSITORY RECTAL ONCE AS NEEDED
Status: DISCONTINUED | OUTPATIENT
Start: 2017-09-06 | End: 2017-09-06 | Stop reason: HOSPADM

## 2017-09-06 RX ORDER — DIPHENHYDRAMINE HYDROCHLORIDE 50 MG/ML
12.5 INJECTION INTRAMUSCULAR; INTRAVENOUS
Status: DISCONTINUED | OUTPATIENT
Start: 2017-09-06 | End: 2017-09-06 | Stop reason: HOSPADM

## 2017-09-06 RX ORDER — SODIUM CHLORIDE 0.9 % (FLUSH) 0.9 %
1-10 SYRINGE (ML) INJECTION AS NEEDED
Status: DISCONTINUED | OUTPATIENT
Start: 2017-09-06 | End: 2017-09-06 | Stop reason: HOSPADM

## 2017-09-06 RX ORDER — EPHEDRINE SULFATE 50 MG/ML
5 INJECTION, SOLUTION INTRAVENOUS ONCE AS NEEDED
Status: DISCONTINUED | OUTPATIENT
Start: 2017-09-06 | End: 2017-09-06 | Stop reason: HOSPADM

## 2017-09-06 RX ORDER — ROCURONIUM BROMIDE 10 MG/ML
INJECTION, SOLUTION INTRAVENOUS AS NEEDED
Status: DISCONTINUED | OUTPATIENT
Start: 2017-09-06 | End: 2017-09-06 | Stop reason: SURG

## 2017-09-06 RX ADMIN — NEOSTIGMINE METHYLSULFATE 2.5 MG: 1 INJECTION INTRAMUSCULAR; INTRAVENOUS; SUBCUTANEOUS at 10:46

## 2017-09-06 RX ADMIN — SODIUM CHLORIDE, POTASSIUM CHLORIDE, SODIUM LACTATE AND CALCIUM CHLORIDE 9 ML/HR: 600; 310; 30; 20 INJECTION, SOLUTION INTRAVENOUS at 08:10

## 2017-09-06 RX ADMIN — SODIUM CHLORIDE, POTASSIUM CHLORIDE, SODIUM LACTATE AND CALCIUM CHLORIDE: 600; 310; 30; 20 INJECTION, SOLUTION INTRAVENOUS at 10:25

## 2017-09-06 RX ADMIN — FENTANYL CITRATE 50 MCG: 50 INJECTION INTRAMUSCULAR; INTRAVENOUS at 09:35

## 2017-09-06 RX ADMIN — ONDANSETRON 4 MG: 2 INJECTION INTRAMUSCULAR; INTRAVENOUS at 09:41

## 2017-09-06 RX ADMIN — LIDOCAINE HYDROCHLORIDE 60 MG: 20 INJECTION, SOLUTION INFILTRATION; PERINEURAL at 09:19

## 2017-09-06 RX ADMIN — FENTANYL CITRATE 100 MCG: 50 INJECTION INTRAMUSCULAR; INTRAVENOUS at 09:19

## 2017-09-06 RX ADMIN — OXYCODONE HYDROCHLORIDE AND ACETAMINOPHEN 1 TABLET: 7.5; 325 TABLET ORAL at 13:11

## 2017-09-06 RX ADMIN — FAMOTIDINE 20 MG: 10 INJECTION, SOLUTION INTRAVENOUS at 08:16

## 2017-09-06 RX ADMIN — DEXAMETHASONE SODIUM PHOSPHATE 10 MG: 10 INJECTION INTRAMUSCULAR; INTRAVENOUS at 09:38

## 2017-09-06 RX ADMIN — CEFAZOLIN SODIUM 2 G: 2 INJECTION, SOLUTION INTRAVENOUS at 09:30

## 2017-09-06 RX ADMIN — GLYCOPYRROLATE 0.4 MG: 0.2 INJECTION INTRAMUSCULAR; INTRAVENOUS at 10:46

## 2017-09-06 RX ADMIN — PROPOFOL 150 MG: 10 INJECTION, EMULSION INTRAVENOUS at 09:19

## 2017-09-06 RX ADMIN — ROCURONIUM BROMIDE 40 MG: 10 INJECTION INTRAVENOUS at 09:19

## 2017-09-06 RX ADMIN — FENTANYL CITRATE 50 MCG: 50 INJECTION INTRAMUSCULAR; INTRAVENOUS at 09:54

## 2017-09-06 RX ADMIN — OXYCODONE AND ACETAMINOPHEN 1 TABLET: 7.5; 325 TABLET ORAL at 13:11

## 2017-09-06 RX ADMIN — KETOROLAC TROMETHAMINE 30 MG: 30 INJECTION, SOLUTION INTRAMUSCULAR; INTRAVENOUS at 10:45

## 2017-09-06 RX ADMIN — MIDAZOLAM 2 MG: 1 INJECTION INTRAMUSCULAR; INTRAVENOUS at 08:16

## 2017-09-06 NOTE — PLAN OF CARE
Problem: Patient Care Overview (Adult)  Goal: Plan of Care Review  Outcome: Ongoing (interventions implemented as appropriate)    09/06/17 0743   Coping/Psychosocial Response Interventions   Plan Of Care Reviewed With patient   Patient Care Overview   Progress no change       Goal: Adult Individualization and Mutuality  Outcome: Ongoing (interventions implemented as appropriate)    09/06/17 0743   Individualization   Patient Specific Preferences none       Goal: Discharge Needs Assessment  Outcome: Ongoing (interventions implemented as appropriate)    09/06/17 0743   Discharge Needs Assessment   Concerns To Be Addressed no discharge needs identified         Problem: Perioperative Period (Adult)  Goal: Signs and Symptoms of Listed Potential Problems Will be Absent or Manageable (Perioperative Period)  Outcome: Ongoing (interventions implemented as appropriate)    09/06/17 0743   Perioperative Period   Problems Assessed (Perioperative Period) all   Problems Present (Perioperative Period) pain;physiologic stress response

## 2017-09-06 NOTE — PLAN OF CARE
Problem: Patient Care Overview (Adult)  Goal: Plan of Care Review  Outcome: Outcome(s) achieved Date Met:  09/06/17 09/06/17 1413   Coping/Psychosocial Response Interventions   Plan Of Care Reviewed With patient;spouse   Patient Care Overview   Progress improving   Outcome Evaluation   Outcome Summary/Follow up Plan ready for dscharge

## 2017-09-06 NOTE — ANESTHESIA PROCEDURE NOTES
Airway  Urgency: elective    Airway not difficult    General Information and Staff    Patient location during procedure: OR  CRNA: FIOR [facility]LE    Indications and Patient Condition  Indications for airway management: airway protection    Preoxygenated: yes  Mask difficulty assessment: 1 - vent by mask    Final Airway Details  Final airway type: endotracheal airway      Successful airway: ETT  Cuffed: yes   Successful intubation technique: direct laryngoscopy  Endotracheal tube insertion site: oral  Blade: Yevgeniy  Blade size: #3  ETT size: 7.0 mm  Cormack-Lehane Classification: grade I - full view of glottis  Placement verified by: chest auscultation and capnometry   Measured from: lips  ETT to lips (cm): 22  Number of attempts at approach: 1    Additional Comments   ett cuff up at MOP

## 2017-09-06 NOTE — PLAN OF CARE
Problem: Patient Care Overview (Adult)  Goal: Discharge Needs Assessment  Outcome: Outcome(s) achieved Date Met:  09/06/17 09/06/17 1413   Discharge Needs Assessment   Concerns To Be Addressed no discharge needs identified

## 2017-09-06 NOTE — OP NOTE
Preop diagnosis: Lumbar stenosis at L4 5 and a disc herniation at L5-S1    Postop diagnosis: same    Procedure performed:  Left L4 5 laminectomy foraminotomy and facetectomy and a left L5-S1 laminectomy foraminotomy and medial facetectomy and discectomy all done using minimal access spinal technologies microsurgical technique microsurgical instrumentation    Surgeon: Cecil Gomez M.D.    Assistant: Joaquina Arizmendi CFA who was instrumental in helping with visualization of neural structures, hemostasis, and retraction of neural structures.    Indications for the procedure:  This is a patient with severe pain in the legs.  Previous imaging had shown neural compression at the L4 5 and L5-S1 levels.  As a result of this and the failure of conservative therapy the patient elected to proceed with surgery.    Operative summary:  After induction of general anesthesia the patient was intubated and placed on the operating table in the prone position on a Crescencio table.  All pressure points were padded including peripheral points of entrapment.  The back was prepped with Chloraprep and then draped with Ioban, half sheets, and a split sheet.      The L5 level was localized with intraoperative fluoroscopy an incision was made on the left just above the pedicle.  Successive dilating tubes up to 18 mm by 7 cm were placed over that area.  Soft tissue was then removed from the supralaminar space.  The inferior laminar arch of L5 as well as the superior laminar arch of S1 and the medial aspect of facet were removed with the Leisenring drill.  The remainder of the operation was done under high-power magnification of the operating microscope using microsurgical technique and microsurgical instrumentation.  The ligamentum flavum was opened and removed out to the level of the pedicle using the Kerrison rongeurs.  This exposed the lateral thecal sac and the nerve root of S1.  Once the lateral recess was opened the dissection was carried up  to the L5 pedicle completely decompressing the superior nerve root.    Once this was done the S1 nerve root was mobilized medially to expose the disc.  There was evidence of a disc herniation compressing the nerve just under the nerve root.  This was carefully teased out and then the disc space was incised and disc material was removed with the down-biting cervical curettes and the pituitary rongeurs.  Once the disc space was emptied as much as possible bleeding was controlled with bipolar cautery.    Following this the tube was then angled more superiorly and the L4 inferior laminar arch as well as the superior L5 laminar arch were removed with the Embly drill.  Ligamentum flavum was opened at this level as well and dissection was carried out to the level of the pedicle thoroughly decompressing the lateral recess.  Once both the L5 and L4 nerves were decompressed we inspected the disc but there was no evidence of a disc herniation at this level and so I elected to leave the disc in place.    Once the entire decompression was completed we were able to explore under the nerve root and the thecal sac using the Garcia ball probe to be sure there was no evidence of residual compression.there being none bleeding was controlled again using the bipolar cautery, FloSeal, and thrombin and Gelfoam.  The area was then copiously irrigated with bacitracin solution and the tube was removed. The paraspinous musculature was injected with 100 cc 1/8% Marcaine with 1:200,000 epinephrine solution.    Another gram of Kefzol was given prior to closure.    The incision was then closed in layersdressed and the patient was taken to the recovery room in stable condition there were no apparent complications.  Sponge, instrument, and needle counts were correct at the end of the procedure.

## 2017-09-06 NOTE — PLAN OF CARE
Problem: Perioperative Period (Adult)  Goal: Signs and Symptoms of Listed Potential Problems Will be Absent or Manageable (Perioperative Period)  Outcome: Ongoing (interventions implemented as appropriate)    09/06/17 1059   Perioperative Period   Problems Assessed (Perioperative Period) all   Problems Present (Perioperative Period) none

## 2017-09-06 NOTE — ANESTHESIA POSTPROCEDURE EVALUATION
"Patient: Kamille Arizmendi    Procedure Summary     Date Anesthesia Start Anesthesia Stop Room / Location    09/06/17 0915 1104  [facility] / [facility]       Procedure Diagnosis Surgeon Provider    Left L4-5, L5- S1 laminectomy and discectomy with Metrix (Left Spine Lumbar) Neuritis or radiculitis due to rupture of lumbar intervertebral disc  (Neuritis or radiculitis due to rupture of lumbar intervertebral disc [M51.16]) MD Abdirashid Gutierrez MD          Anesthesia Type: No value filed.  Last vitals  BP   131/77 (09/06/17 1345)    Temp        Pulse   94 (09/06/17 1345)   Resp   18 (09/06/17 1345)    SpO2   97 % (09/06/17 1345)      Post Anesthesia Care and Evaluation    Patient location during evaluation: PACU  Patient participation: complete - patient participated  Level of consciousness: awake and alert  Pain management: adequate  Airway patency: patent  Anesthetic complications: No anesthetic complications    Cardiovascular status: acceptable  Respiratory status: acceptable  Hydration status: acceptable    Comments: /77  Pulse 94  Temp 36.7 °C (98 °F) (Oral)   Resp 18  Ht 67\" (170.2 cm)  Wt 207 lb 9.6 oz (94.2 kg)  SpO2 97%  BMI 32.51 kg/m2      "

## 2017-09-06 NOTE — BRIEF OP NOTE
LUMBAR DISCECTOMY POSTERIOR WITH METRIX  Procedure Note    Kamille Arizmendi  9/6/2017    Pre-op Diagnosis:   Neuritis or radiculitis due to rupture of lumbar intervertebral disc [M51.16]    Post-op Diagnosis:     Post-Op Diagnosis Codes:     * Neuritis or radiculitis due to rupture of lumbar intervertebral disc [M51.16]    Procedure/CPT® Codes:      Procedure(s):  Left L4-5, L5- S1 laminectomy and discectomy with Metrix    Surgeon(s):  Cecil Gomez MD    Anesthesia: General    Staff:   Circulator: Jacklyn Hassan RN  Radiology Technologist: Evelyne Payne  Scrub Person: Virgen Hernandez; Sidra Garcias  Assistant: Joaquina Arizmendi CSA  Other: Elisabeth Chan RN    Estimated Blood Loss: 100 cc  Urine Voided: * No values recorded between 9/6/2017  9:14 AM and 9/6/2017 10:56 AM *    Specimens:                none      Drains:           Findings: HNP at L5S1 stenosis at L45    Complications: none      Cecil Gomze MD     Date: 9/6/2017  Time: 11:11 AM

## 2017-09-06 NOTE — PLAN OF CARE
Problem: Perioperative Period (Adult)  Goal: Signs and Symptoms of Listed Potential Problems Will be Absent or Manageable (Perioperative Period)  Outcome: Outcome(s) achieved Date Met:  09/06/17 09/06/17 1412   Perioperative Period   Problems Present (Perioperative Period) none

## 2017-09-12 ENCOUNTER — TELEPHONE (OUTPATIENT)
Dept: NEUROSURGERY | Facility: CLINIC | Age: 47
End: 2017-09-12

## 2017-09-25 ENCOUNTER — OFFICE VISIT (OUTPATIENT)
Dept: NEUROSURGERY | Facility: CLINIC | Age: 47
End: 2017-09-25

## 2017-09-25 VITALS
BODY MASS INDEX: 32.3 KG/M2 | WEIGHT: 205.8 LBS | HEART RATE: 132 BPM | SYSTOLIC BLOOD PRESSURE: 141 MMHG | HEIGHT: 67 IN | DIASTOLIC BLOOD PRESSURE: 85 MMHG

## 2017-09-25 DIAGNOSIS — Z09 SURGERY FOLLOW-UP EXAMINATION: Primary | ICD-10-CM

## 2017-09-25 PROBLEM — M51.16 NEURITIS OR RADICULITIS DUE TO RUPTURE OF LUMBAR INTERVERTEBRAL DISC: Status: RESOLVED | Noted: 2017-07-06 | Resolved: 2017-09-25

## 2017-09-25 PROCEDURE — 99024 POSTOP FOLLOW-UP VISIT: CPT | Performed by: PHYSICIAN ASSISTANT

## 2017-09-25 RX ORDER — DULOXETIN HYDROCHLORIDE 30 MG/1
CAPSULE, DELAYED RELEASE ORAL
COMMUNITY
Start: 2017-09-23

## 2017-09-25 RX ORDER — HYDROCODONE BITARTRATE AND ACETAMINOPHEN 5; 325 MG/1; MG/1
TABLET ORAL
Refills: 0 | COMMUNITY
Start: 2017-09-06

## 2017-09-25 NOTE — PROGRESS NOTES
Subjective   Patient ID: Kamille Arizmendi is a 46 y.o. female is here today for follow-up.  She is almost 3wks out from a left L4-5 laminectomy by Dr. Gomez 9/6/17. She is doing well. She denies any incision problems. Ms. Arizmendi takes Hydrocodone 5/325 and Diclofenac 75 mg BID for pain.         Back Pain   This is a chronic problem. The current episode started more than 1 year ago. The problem occurs 2 to 4 times per day. The problem has been gradually improving since onset. The pain is present in the gluteal and sacro-iliac. The quality of the pain is described as aching and shooting. The pain radiates to the left thigh. The pain is at a severity of 7/10. The pain is the same all the time. The symptoms are aggravated by coughing and position. Stiffness is present all day. Pertinent negatives include no abdominal pain, bladder incontinence, bowel incontinence, chest pain, dysuria, fever, headaches, leg pain, numbness, paresis, paresthesias, pelvic pain, perianal numbness, tingling, weakness or weight loss.       The following portions of the patient's history were reviewed and updated as appropriate: allergies, current medications, past family history, past medical history, past social history, past surgical history and problem list.    Review of Systems   Constitutional: Negative for fever and weight loss.   Cardiovascular: Negative for chest pain.   Gastrointestinal: Negative for abdominal pain and bowel incontinence.   Genitourinary: Negative for bladder incontinence, dysuria and pelvic pain.   Musculoskeletal: Positive for back pain.   Neurological: Negative for tingling, weakness, numbness, headaches and paresthesias.       Objective   Physical Exam   Neurological:   Incision ok     Neurologic Exam    Assessment/Plan   Independent Review of Radiographic Studies:      Medical Decision Making:    Ms. Arizmendi is 3 weeks out from a left L4-L5 laminectomy/discectomy.  She is doing quite well but does have some back and  leg pain.  I reassured her that the residual pain is quite normal.  She is using 1 pain pill per day.  Her wound has healed nicely without signs of infection.  She works from home but has to sit for prolonged periods and will continue to stay off work for now.  She will begin physical therapy.  We discussed her restrictions.    She requested a refill from Dr. Gomez today for her Norco. He will wean her to Ultram 50mg 1q 12 hrs prn, #30.  She understands that is her last Rx for pain meds.     F/u in 4wks.   Kamille was seen today for post-op.    Diagnoses and all orders for this visit:    Surgery follow-up examination  -     Ambulatory Referral to Physical Therapy Evaluate and treat (2-3 times per week for 4wks)    Return in about 4 weeks (around 10/23/2017) for with Dr. Gomez.

## 2017-10-31 ENCOUNTER — OFFICE VISIT (OUTPATIENT)
Dept: NEUROSURGERY | Facility: CLINIC | Age: 47
End: 2017-10-31

## 2017-10-31 VITALS
SYSTOLIC BLOOD PRESSURE: 126 MMHG | BODY MASS INDEX: 32.18 KG/M2 | DIASTOLIC BLOOD PRESSURE: 80 MMHG | WEIGHT: 205 LBS | HEART RATE: 87 BPM | HEIGHT: 67 IN

## 2017-10-31 DIAGNOSIS — Z09 SURGERY FOLLOW-UP EXAMINATION: Primary | ICD-10-CM

## 2017-10-31 PROCEDURE — 99024 POSTOP FOLLOW-UP VISIT: CPT | Performed by: NEUROLOGICAL SURGERY

## 2017-10-31 RX ORDER — TRAMADOL HYDROCHLORIDE 50 MG/1
TABLET ORAL
Refills: 0 | COMMUNITY
Start: 2017-09-25

## 2017-10-31 NOTE — PROGRESS NOTES
Subjective   Patient ID: Kamille Arizmendi is a 46 y.o. female is here today for follow-up. She is 7 weeks out from having a left L4-5 laminectomy by Dr. Gomez 9/6/17. Patient has completed physical therapy and states that everything is going well.    History of Present Illness    This patient returns today.  She is doing well.  She has almost no pain at all.  Her incision looks okay except at the very top there may be a little knot protruding.  There is no evidence of infection.    The following portions of the patient's history were reviewed and updated as appropriate: allergies, current medications, past family history, past medical history, past social history, past surgical history and problem list.    Review of Systems   Respiratory: Negative for shortness of breath.    Cardiovascular: Negative for chest pain.   All other systems reviewed and are negative.      Objective   Physical Exam   Constitutional: She is oriented to person, place, and time. She appears well-developed and well-nourished.   Neurological: She is oriented to person, place, and time.     Neurologic Exam     Mental Status   Oriented to person, place, and time.       Assessment/Plan   Independent Review of Radiographic Studies:      Medical Decision Making:      I told the patient that she could return to normal activities.  She is to call me if any problems develop and we'll avoid strenuously heavy lifting.    Kamille was seen today for post-op.    Diagnoses and all orders for this visit:    Surgery follow-up examination    Return if symptoms worsen or fail to improve.

## 2017-11-07 NOTE — ANESTHESIA PROCEDURE NOTES
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  Needle Gauge:20 G  Aspiration:negative  Test Dose:negative  Medications:  Depomedrol:80 mg  Preservative Free Saline:2mL    Post Assessment:  Pt Tolerance:patient tolerated the procedure well with no apparent complications  Complications:no            
Spine appears normal, range of motion is not limited, no muscle or joint tenderness

## 2021-03-30 ENCOUNTER — BULK ORDERING (OUTPATIENT)
Dept: CASE MANAGEMENT | Facility: OTHER | Age: 51
End: 2021-03-30

## 2021-03-30 DIAGNOSIS — Z23 IMMUNIZATION DUE: ICD-10-CM

## 2022-03-18 ENCOUNTER — OUTSIDE PLACE OF SERVICE (OUTPATIENT)
Dept: PULMONOLOGY | Facility: CLINIC | Age: 52
End: 2022-03-18

## 2022-03-18 PROCEDURE — 31624 PR BRONCHOSCOPY,DIAG2STIC W LAVAGE: ICD-10-PCS | Mod: RT,,, | Performed by: INTERNAL MEDICINE

## 2022-03-18 PROCEDURE — 99291 PR CRITICAL CARE, E/M 30-74 MINUTES: ICD-10-PCS | Mod: 25,,, | Performed by: INTERNAL MEDICINE

## 2022-03-18 PROCEDURE — 99292 PR CRITICAL CARE, ADDL 30 MIN: ICD-10-PCS | Mod: 25,,, | Performed by: INTERNAL MEDICINE

## 2022-03-18 PROCEDURE — 99291 CRITICAL CARE FIRST HOUR: CPT | Mod: 25,,, | Performed by: INTERNAL MEDICINE

## 2022-03-18 PROCEDURE — 31624 DX BRONCHOSCOPE/LAVAGE: CPT | Mod: RT,,, | Performed by: INTERNAL MEDICINE

## 2022-03-18 PROCEDURE — 36620 PR INSERT CATH,ART,PERCUT,SHORTTERM: ICD-10-PCS | Mod: 59,,, | Performed by: INTERNAL MEDICINE

## 2022-03-18 PROCEDURE — 99292 CRITICAL CARE ADDL 30 MIN: CPT | Mod: 25,,, | Performed by: INTERNAL MEDICINE

## 2022-03-18 PROCEDURE — 36620 INSERTION CATHETER ARTERY: CPT | Mod: 59,,, | Performed by: INTERNAL MEDICINE

## 2022-03-19 ENCOUNTER — OUTSIDE PLACE OF SERVICE (OUTPATIENT)
Dept: PULMONOLOGY | Facility: CLINIC | Age: 52
End: 2022-03-19

## 2022-03-19 PROCEDURE — 99291 PR CRITICAL CARE, E/M 30-74 MINUTES: ICD-10-PCS | Mod: ,,, | Performed by: INTERNAL MEDICINE

## 2022-03-19 PROCEDURE — 99291 CRITICAL CARE FIRST HOUR: CPT | Mod: ,,, | Performed by: INTERNAL MEDICINE

## 2022-03-20 ENCOUNTER — OUTSIDE PLACE OF SERVICE (OUTPATIENT)
Dept: PULMONOLOGY | Facility: CLINIC | Age: 52
End: 2022-03-20

## 2022-03-20 PROCEDURE — 99291 CRITICAL CARE FIRST HOUR: CPT | Mod: ,,, | Performed by: INTERNAL MEDICINE

## 2022-03-20 PROCEDURE — 99291 PR CRITICAL CARE, E/M 30-74 MINUTES: ICD-10-PCS | Mod: ,,, | Performed by: INTERNAL MEDICINE

## 2022-03-28 ENCOUNTER — OUTSIDE PLACE OF SERVICE (OUTPATIENT)
Dept: PULMONOLOGY | Facility: CLINIC | Age: 52
End: 2022-03-28

## 2022-03-28 PROCEDURE — 99291 CRITICAL CARE FIRST HOUR: CPT | Mod: ,,, | Performed by: INTERNAL MEDICINE

## 2022-03-28 PROCEDURE — 99291 PR CRITICAL CARE, E/M 30-74 MINUTES: ICD-10-PCS | Mod: ,,, | Performed by: INTERNAL MEDICINE

## 2022-03-29 ENCOUNTER — OUTSIDE PLACE OF SERVICE (OUTPATIENT)
Dept: PULMONOLOGY | Facility: CLINIC | Age: 52
End: 2022-03-29

## 2022-03-29 PROCEDURE — 99291 PR CRITICAL CARE, E/M 30-74 MINUTES: ICD-10-PCS | Mod: ,,, | Performed by: INTERNAL MEDICINE

## 2022-03-29 PROCEDURE — 99291 CRITICAL CARE FIRST HOUR: CPT | Mod: ,,, | Performed by: INTERNAL MEDICINE

## 2022-03-30 ENCOUNTER — OUTSIDE PLACE OF SERVICE (OUTPATIENT)
Dept: PULMONOLOGY | Facility: CLINIC | Age: 52
End: 2022-03-30

## 2022-03-30 PROCEDURE — 99291 PR CRITICAL CARE, E/M 30-74 MINUTES: ICD-10-PCS | Mod: ,,, | Performed by: INTERNAL MEDICINE

## 2022-03-30 PROCEDURE — 99291 CRITICAL CARE FIRST HOUR: CPT | Mod: ,,, | Performed by: INTERNAL MEDICINE

## 2022-03-31 ENCOUNTER — OUTSIDE PLACE OF SERVICE (OUTPATIENT)
Dept: PULMONOLOGY | Facility: CLINIC | Age: 52
End: 2022-03-31

## 2022-03-31 PROCEDURE — 99291 PR CRITICAL CARE, E/M 30-74 MINUTES: ICD-10-PCS | Mod: ,,, | Performed by: INTERNAL MEDICINE

## 2022-03-31 PROCEDURE — 99291 CRITICAL CARE FIRST HOUR: CPT | Mod: ,,, | Performed by: INTERNAL MEDICINE

## 2022-04-01 ENCOUNTER — OUTSIDE PLACE OF SERVICE (OUTPATIENT)
Dept: PULMONOLOGY | Facility: CLINIC | Age: 52
End: 2022-04-01

## 2022-04-01 PROCEDURE — 99291 PR CRITICAL CARE, E/M 30-74 MINUTES: ICD-10-PCS | Mod: ,,, | Performed by: INTERNAL MEDICINE

## 2022-04-01 PROCEDURE — 99291 CRITICAL CARE FIRST HOUR: CPT | Mod: ,,, | Performed by: INTERNAL MEDICINE

## 2022-04-02 ENCOUNTER — OUTSIDE PLACE OF SERVICE (OUTPATIENT)
Dept: PULMONOLOGY | Facility: CLINIC | Age: 52
End: 2022-04-02

## 2022-04-02 PROCEDURE — 99291 PR CRITICAL CARE, E/M 30-74 MINUTES: ICD-10-PCS | Mod: ,,, | Performed by: INTERNAL MEDICINE

## 2022-04-02 PROCEDURE — 99291 CRITICAL CARE FIRST HOUR: CPT | Mod: ,,, | Performed by: INTERNAL MEDICINE

## 2022-04-03 ENCOUNTER — OUTSIDE PLACE OF SERVICE (OUTPATIENT)
Dept: PULMONOLOGY | Facility: CLINIC | Age: 52
End: 2022-04-03

## 2022-04-03 PROCEDURE — 99291 PR CRITICAL CARE, E/M 30-74 MINUTES: ICD-10-PCS | Mod: ,,, | Performed by: INTERNAL MEDICINE

## 2022-04-03 PROCEDURE — 99291 CRITICAL CARE FIRST HOUR: CPT | Mod: ,,, | Performed by: INTERNAL MEDICINE

## 2022-04-11 ENCOUNTER — OUTSIDE PLACE OF SERVICE (OUTPATIENT)
Dept: PULMONOLOGY | Facility: CLINIC | Age: 52
End: 2022-04-11

## 2022-04-11 PROCEDURE — 99291 CRITICAL CARE FIRST HOUR: CPT | Mod: ,,, | Performed by: INTERNAL MEDICINE

## 2022-04-11 PROCEDURE — 99291 PR CRITICAL CARE, E/M 30-74 MINUTES: ICD-10-PCS | Mod: ,,, | Performed by: INTERNAL MEDICINE

## 2022-04-12 ENCOUNTER — OUTSIDE PLACE OF SERVICE (OUTPATIENT)
Dept: PULMONOLOGY | Facility: CLINIC | Age: 52
End: 2022-04-12

## 2022-04-12 PROCEDURE — 99291 PR CRITICAL CARE, E/M 30-74 MINUTES: ICD-10-PCS | Mod: ,,, | Performed by: INTERNAL MEDICINE

## 2022-04-12 PROCEDURE — 99291 CRITICAL CARE FIRST HOUR: CPT | Mod: ,,, | Performed by: INTERNAL MEDICINE

## 2022-04-13 ENCOUNTER — OUTSIDE PLACE OF SERVICE (OUTPATIENT)
Dept: PULMONOLOGY | Facility: CLINIC | Age: 52
End: 2022-04-13

## 2022-04-13 PROCEDURE — 99291 PR CRITICAL CARE, E/M 30-74 MINUTES: ICD-10-PCS | Mod: ,,, | Performed by: INTERNAL MEDICINE

## 2022-04-13 PROCEDURE — 99291 CRITICAL CARE FIRST HOUR: CPT | Mod: ,,, | Performed by: INTERNAL MEDICINE

## 2022-04-14 ENCOUNTER — OUTSIDE PLACE OF SERVICE (OUTPATIENT)
Dept: PULMONOLOGY | Facility: CLINIC | Age: 52
End: 2022-04-14

## 2022-04-14 PROCEDURE — 99291 PR CRITICAL CARE, E/M 30-74 MINUTES: ICD-10-PCS | Mod: ,,, | Performed by: INTERNAL MEDICINE

## 2022-04-14 PROCEDURE — 99291 CRITICAL CARE FIRST HOUR: CPT | Mod: ,,, | Performed by: INTERNAL MEDICINE

## 2023-03-29 NOTE — ANESTHESIA PREPROCEDURE EVALUATION
Anesthesia Evaluation     Patient summary reviewed and Nursing notes reviewed   history of anesthetic complications: PONV  NPO Solid Status: > 8 hours  NPO Liquid Status: > 8 hours     Airway   Mallampati: II  TM distance: >3 FB  Neck ROM: full  no difficulty expected  Dental - normal exam     Pulmonary - normal exam   Cardiovascular - normal exam    (+) hypertension,       Neuro/Psych  (+) numbness, psychiatric history Anxiety and Depression,    GI/Hepatic/Renal/Endo    (+) obesity,      Musculoskeletal     Abdominal  - normal exam   Substance History      OB/GYN          Other   (+) arthritis                                     Anesthesia Plan    ASA 2     intravenous induction   Anesthetic plan and risks discussed with patient.       Pt was intermittently crying throughout visit. Initially, pt expressed anger about not being able to take rosary into operating room during her surgery, which she states she had previously been able to do. Pt disclosed that her late  was allowed to take in rosary into emergency surgery, after which he lived for several months. Pt credits his survival, partially, to the presence of rosary. Pt appears fearful of diagnosis/surgery, and stated wanting just a little extra help like her late  had. Chp attempted to build a relationship of care and support by validating pts experiences and emotions, asking questions to bring forth grief emotions, and discussing her concerns. Per pts request, chp put in referral for \[facility]\"" to visit. Chp will continue to follow.     Margot Adair (she/her)  Chaplain Resident [id number]

## (undated) DEVICE — GLV SURG SENSICARE W/ALOE PF LF 7.5 STRL

## (undated) DEVICE — PK NEURO SPINE 40

## (undated) DEVICE — CODMAN® SURGICAL PATTIES 3/4" X 3/4" (1.91CM X 1.91CM): Brand: CODMAN®

## (undated) DEVICE — SYR CONTRL LUERLOK 10CC

## (undated) DEVICE — UNDYED BRAIDED (POLYGLACTIN 910), SYNTHETIC ABSORBABLE SUTURE: Brand: COATED VICRYL

## (undated) DEVICE — DRP MICROSCP LEICA W/GLASS LENS

## (undated) DEVICE — SMOKE EVACUATION TUBING WITH 7/8 IN TO 1/4 IN REDUCER: Brand: BUFFALO FILTER

## (undated) DEVICE — CONN TBG Y 5 IN 1 LF STRL

## (undated) DEVICE — NDL SPINE 20G 3 1/2 YEL STRL 1P/U

## (undated) DEVICE — Device

## (undated) DEVICE — SPNG GZ WOVN 4X4IN 12PLY 10/BX STRL

## (undated) DEVICE — SOL NACL 0.9PCT 100ML SGL

## (undated) DEVICE — ADHS SKIN DERMABOND TOP ADVANCED

## (undated) DEVICE — GLV SURG BIOGEL LTX PF 7

## (undated) DEVICE — OIL CARTRIDGE: Brand: CORE, MAESTRO

## (undated) DEVICE — 6.0MM PRECISION ROUND

## (undated) DEVICE — DIFFUSER: Brand: CORE, MAESTRO

## (undated) DEVICE — SHEET, DRAPE, SPLIT, STERILE: Brand: MEDLINE

## (undated) DEVICE — APPL CHLORAPREP W/TINT 26ML ORNG

## (undated) DEVICE — FLOSEAL MATRIX IS INDICATED IN SURGICAL PROCEDURES (OTHER THAN IN OPHTHALMIC) AS AN ADJUNCT TO HEMOSTASIS WHEN CONTROL OF BLEEDING BY LIGATURE OR CONVENTIONAL PROCEDURES IS INEFFECTIVE OR IMPRACTICAL.: Brand: FLOSEAL HEMOSTATIC MATRIX